# Patient Record
Sex: FEMALE | Race: WHITE
[De-identification: names, ages, dates, MRNs, and addresses within clinical notes are randomized per-mention and may not be internally consistent; named-entity substitution may affect disease eponyms.]

---
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
## 2022-01-20 NOTE — DIAGNOSTIC IMAGING REPORT
INDICATION: Fetal survey.



TECHNIQUE: Multiple real-time grayscale images were obtained over

the gravid uterus.



COMPARISON: None



FINDINGS: There is a single live fetus in a variable

presentation. Fetal heart rate was recorded at 134 bpm. Placenta

is lateral and to the right. Amniotic fluid volume appears

normal. Fetal kidneys, bladder and stomach are unremarkable.

Fetal brain is unremarkable. There is a four-chamber heart. There

is a three-vessel cord with normal insertion. Fetal spine is

unremarkable. Outflow tracts were somewhat limited due to fetal

position.



Biometrical measurements are as follows:

Biparietal 4.75 cm, age 20 weeks 3 days.

Head circumference 18.02 cm, age 20 weeks 4 days.

Abdominal circumference 15.13 cm, age 20 weeks 3 days.

Femur length 3.40 cm, age 20 weeks 5 days.



Sonographic estimate age: 20 weeks 4 days.

Sonographic estimated date of delivery: 06/05/2022.



Estimated Fetal Weight: 358 gm (+/- 52  gm).

LMP percentile: 33%.



Fetal heart rate: 134 beats per minute.



Fetal number: 1 of 1.



IMPRESSION: Single live IUP 20 weeks 4 days gestational age.

Estimated date of confinement sonographically 06/05/2022.



Dictated by: 



  Dictated on workstation # [id number]

## 2022-05-04 NOTE — DISCHARGE INST-WOMEN'S SERVICE
Discharge Inst-Women's Serv


Depart Medication/Instructions


New, Converted or Re-Newed RX:  Transmitted to Pharmacy


Final Diagnosis


POD 2 RLTCS


Problems Reviewed?:  Yes





Consults/Follow Up


Additional Follow Up:  Yes


Orders/Referrals


Dr. Barreto in 7-10 days and in 6 weeks





Activity


Activity:  Activity as Tolerated


Driving Instructions:  No Driving for 1 Week


NO SMOKING:  NO SMOKING


Nothing Inside Vagina:  No Douching, No Strayhorn, No Tampons





Diet


Discharge Diet:  No Restrictions


Symptoms to Report to :  Bleeding Excessive, Pain Increased, Fever Over 101 

Degrees F, Vaginal Bleeding Increase, Questions/Concerns


For Any Problems or Questions:  Contact Your Physician





Skin/Wound Care


Infection Signs and Symptoms:  Increased Redness, Foul Odor of Wound, Increased 

Drainage, Skin Itchy or Has a Rash, Increased Swelling, Temperature Above 101  F


Operative Area Clean and Dry:  Keep Incision Clean/Dry


Stitches/Staples/Dermabond:  Dermabond, Care of Stitches


Bathing Instructions:  AUGUSTO Nguyen DO             May 4, 2022 16:01

## 2022-05-04 NOTE — DIAGNOSTIC IMAGING REPORT
INDICATION: Evaluate growth and amniotic fluid volume.



TECHNIQUE: Multiple real-time grayscale images were obtained over

the gravid uterus.



COMPARISON: 01/20/2022.



FINDINGS: There is a single live fetus in a cephalic

presentation. Fetal heart rate was recorded at 156 bpm. Placenta

is lateral and to the right. Amniotic fluid index is 11.3 cm.



Biometrical measurements are as follows:

Biparietal 8.59 cm, age 34 weeks 5 days.

Head circumference 31.75 cm, age 35 weeks 5 days.

Abdominal circumference 29.76 cm, age 33 weeks 6 days.

Femur length 6.04 cm, age 31 weeks 3 days.



Sonographic estimate age: 34 weeks 0 days.

Sonographic estimated date of delivery: 06/15/2022.



Estimated Fetal Weight: 2176 gm (+/- 318  gm).

LMP percentile: 6%.



Fetal heart rate: 156 beats per minute.



Fetal number: 1 of 1.



IMPRESSION: Single live IUP 34 weeks gestational age showing

normal interval growth when compared with prior exam. No

complicating features are detected.



Dictated by: 



  Dictated on workstation # [id number]

## 2022-05-04 NOTE — HISTORY & PHYSICAL-OB
OB - Chief Complaint & HPI


Date/Time


Date of Admission:


Date of Admission:


Date seen by a Provider:  May 4, 2022


Time Seen by a Provider:  11:45





Chief Complaint/History


OB-Reason for Admission/Chief:  Obstetrical Complication


Hx :  2


Hx Para:  1


Expected Date of Delivery:  2022


Gestational Age in Weeks:  35


Gestational Age in Days:  4


Indication for :  desires repeat 


Other reason for admission:


Patient admitted after presenting for abdominal pains and elevated BP.  Her BP 

at arrival was in the 150s-160s/100s.  There were also variable decelerations 

noted on NST.  US ordered after arrival showed absent end diastolic flow, and 

suspected IUGR.


Admission Nurse Assessment Rev:  Yes





Allergies and Home Medications


Allergies


Coded Allergies:  


     No Known Drug Allergies (Unverified , 18)





Patient Home Medication List


Home Medication List Reviewed:  Yes


Prenatal Vit/Iron Fumarate/FA (Prenatal Vitamins Tablet) 1 Each Tablet, 1 EACH 

PO DAILY, (Reported)


   Entered as Reported by: ANGIE PETERS on 18


   Last Action: Reviewed


Discontinued Medications


Amoxicillin/Potassium Clav (Augmentin 875-125 Tablet) 1 Each Tablet, 1 EACH PO 

BID


   Discontinued Reason: No Longer Taking


   Prescribed by: JOHN BLUE on 19 1349


   Last Action: Discontinued


Cephalexin (Keflex) 500 Mg Capsule, 500 MG PO BID


   Discontinued Reason: No Longer Taking


   Prescribed by: PHIL YEUNG on 18 1429


   Last Action: Discontinued


Docusate Sodium (Docusate Sodium) 100 Mg Capsule, 100 MG PO BID PRN for 

CONSTIPATION-1ST LINE


   Discontinued Reason: No Longer Taking


   Prescribed by: AUGUSTO HASSAN on 18


   Last Action: Discontinued


Ferrous Sulfate (Iron) 325 Mg Tablet, 325 MG PO DAILY, (Reported)


   Discontinued Reason: No Longer Taking


   Entered as Reported by: ANGIE PETERS on 18


   Last Action: Discontinued


Hydrocodone Bit/Acetaminophen (Lortab  5 Mg Tablet) 1 Tab Tab, 1-2 TAB PO Q4H 

PRN for PAIN-MODERATE


   Discontinued Reason: No Longer Taking


   Prescribed by: AUGUSTO HASSAN on 18


   Last Action: Discontinued


Ibuprofen (Ibuprofen) 600 Mg Tablet, 600 MG PO Q6H


   Discontinued Reason: No Longer Taking


   Prescribed by: AUGUSTO HASSAN on 18 [id number]


   Last Action: Discontinued





OB - History


Hx of Present Pregnancy


Prenatal Care:  Yes


Ultrasounds:  Normal mid trimester US (late prenatal care)


Obstetrical Complications:  Pre-eclampsia


Medical Complications:  None





Obstetrical History


Hx :  2


Hx Para:  1


Hx Total # of Abortions (Spona:  0





Delivery History


Adverse Rxn to Tranfusion:  No





Patient Past Medical History





n/a





Social History/Family History


Alcohol Use:  Denies Use


Recreational Drug Use:  No


2nd Hand Smoke Exposure:  No





Immunizations


Hepatitis A:  Yes


Hepatitis B:  Yes





OB - Admission Exam


Physical Exam


Vitals:





Vital Signs








 22





 08:48


 


Temp 37.4


 


Pulse 60


 


Resp 18


 


Pulse Ox 99


 


O2 Delivery Room Air








HEENT:  NCAT


Heart:  Rhythm Normal


Lungs:  Clear


Extremities:  Normal


Reflexes:  Normal


Membranes:  Intact


Fetal Heart Rate:  130's


Accelerations:  No Accelerations


Decelerations:  Variable Decelerations


Short Term Variability:  Present


Long Term Variability:  Average (6-25)


Contractions on Admission:  6-10 Minutes Apart


Intensity:  Mild


Labs





Laboratory Tests








Test


 22


08:30 22


10:10 Range/Units


 


 


Urine Color YELLOW    


 


Urine Clarity CLEAR    


 


Urine pH 7.0   5-9  


 


Urine Specific Gravity 1.020   1.016-1.022  


 


Urine Protein TRACE H  NEGATIVE  


 


Urine Glucose (UA) NEGATIVE   NEGATIVE  


 


Urine Ketones NEGATIVE   NEGATIVE  


 


Urine Nitrite NEGATIVE   NEGATIVE  


 


Urine Bilirubin NEGATIVE   NEGATIVE  


 


Urine Urobilinogen 0.2   < = 1.0  MG/DL


 


Urine Leukocyte Esterase 2+ H  NEGATIVE  


 


Urine RBC (Auto) NEGATIVE   NEGATIVE  


 


Urine RBC RARE    /HPF


 


Urine WBC 2-5    /HPF


 


Urine Squamous Epithelial


Cells 0-2 


 


  /HPF





 


Urine Crystals NONE    /LPF


 


Urine Bacteria LARGE H   /HPF


 


Urine Casts NONE    /LPF


 


Urine Mucus NEGATIVE    /LPF


 


Urine Culture Indicated YES    


 


White Blood Count


 


 17.0 H


 4.3-11.0


10^3/uL


 


Red Blood Count


 


 4.72 


 3.80-5.11


10^6/uL


 


Hemoglobin  14.2  11.5-16.0  g/dL


 


Hematocrit  42  35-52  %


 


Mean Corpuscular Volume  89  80-99  fL


 


Mean Corpuscular Hemoglobin  30  25-34  pg


 


Mean Corpuscular Hemoglobin


Concent 


 34 


 32-36  g/dL





 


Red Cell Distribution Width  13.3  10.0-14.5  %


 


Platelet Count


 


 175 


 130-400


10^3/uL


 


Mean Platelet Volume  10.8  9.0-12.2  fL


 


Immature Granulocyte % (Auto)  0   %


 


Neutrophils (%) (Auto)  80 H 42-75  %


 


Lymphocytes (%) (Auto)  15  12-44  %


 


Monocytes (%) (Auto)  4  0-12  %


 


Eosinophils (%) (Auto)  1  0-10  %


 


Basophils (%) (Auto)  0  0-10  %


 


Neutrophils # (Auto)


 


 13.7 H


 1.8-7.8


10^3/uL


 


Lymphocytes # (Auto)


 


 2.5 


 1.0-4.0


10^3/uL


 


Monocytes # (Auto)


 


 0.7 


 0.0-1.0


10^3/uL


 


Eosinophils # (Auto)


 


 0.1 


 0.0-0.3


10^3/uL


 


Basophils # (Auto)


 


 0.0 


 0.0-0.1


10^3/uL


 


Immature Granulocyte # (Auto)


 


 0.1 


 0.0-0.1


10^3/uL


 


Neutrophils % (Manual)  75   %


 


Lymphocytes % (Manual)  18   %


 


Monocytes % (Manual)  4   %


 


Eosinophils % (Manual)  2   %


 


Band Neutrophils  1   %


 


Blood Morphology Comment  NORMAL   


 


Sodium Level  138  135-145  MMOL/L


 


Potassium Level  4.3  3.6-5.0  MMOL/L


 


Chloride Level  105    MMOL/L


 


Carbon Dioxide Level  22  21-32  MMOL/L


 


Anion Gap  11  5-14  MMOL/L


 


Blood Urea Nitrogen  7  7-18  MG/DL


 


Creatinine


 


 0.68 


 0.60-1.30


MG/DL


 


Estimat Glomerular Filtration


Rate 


 127 


  





 


BUN/Creatinine Ratio  10   


 


Glucose Level  72    MG/DL


 


Uric Acid  7.3 H 2.6-7.2  MG/DL


 


Calcium Level  9.4  8.5-10.1  MG/DL


 


Corrected Calcium  9.8  8.5-10.1  MG/DL


 


Total Bilirubin  0.5  0.1-1.0  MG/DL


 


Aspartate Amino Transf


(AST/SGOT) 


 65 H


 5-34  U/L





 


Alanine Aminotransferase


(ALT/SGPT) 


 58 H


 0-55  U/L





 


Alkaline Phosphatase  120    U/L


 


Lactate Dehydrogenase  257 H 125-220  U/L


 


Total Protein  6.8  6.4-8.2  GM/DL


 


Albumin  3.5  3.2-4.5  GM/DL


 


Thyroid Stimulating Hormone


(TSH) 


 1.26 


 0.35-4.94


UIU/ML











OB - Assessment/Plan/Diagnosis


Assessment


Assessment:   section


Admission Dx


23 yo  @ 35 weeks


Fetal heart rate decelerations


Fetal growth restriction suspected


Suspected Severe Preeclampsia due to elevated liver enzymes, uric acid, and 

pending Urine protein/cr ratio.


Previous 


Late prenatal care.


GBS unknown


Admission Status:  Inpatient Order (span 2 midnights)


Reason for Inpatient Admission:  


Repeat 





Plan


Plan:   Section











AUGUSTO HASSAN DO             May 4, 2022 11:12

## 2022-05-04 NOTE — OPERATIVE REPORT
DATE OF SERVICE:  



PREOPERATIVE DIAGNOSES:

1.  A 22-year-old G2, P1 at 35 weeks and 4 days' gestation.

2.  Fetal heart rate variable decelerations.

3.  Gestational hypertension with this option of preeclampsia.

4.  Absent end-diastolic flow on uterine artery Dopplers.

5.  Suspected intrauterine growth restriction.

6.  Previous  section.



POSTOPERATIVE DIAGNOSES:

1.  A 22-year-old G2, P1 at 35 weeks and 4 days' gestation.

2.  Fetal heart rate variable decelerations.

3.  Gestational hypertension with this option of preeclampsia.

4.  Absent end-diastolic flow on uterine artery Dopplers.

5.  Suspected intrauterine growth restriction.

6.  Previous  section.



PROCEDURE:

Repeat low transverse  section.



SURGEON:

Tino Hassan DO



ANESTHESIA:

Spinal.



ESTIMATED BLOOD LOSS:

250 mL.



URINE OUTPUT:

100 mL clear at the end of procedure.



FLUIDS:

900 mL lactated Ringer's solution.



FINDINGS:

A live male infant weighing 3 pounds 12 ounces, Apgars of 8 and 9.  Grossly

normal appearing uterus, fallopian tubes and ovaries.



SPECIMEN SENT:

Placenta.



INDICATIONS FOR PROCEDURE:

This 22-year-old female is a patient who had come to the hospital for severe

abdominal pain and "not feeling right."  She reports that this started this

morning.  Upon arrival at the hospital, she had blood pressures of 160s/100s. 

Stat preeclampsia labs were ordered and she was found to have a white count of

17,000 and AST and ALT that were both elevated at 65 and 58 respectively and

uric acid 7.3.  She also was noted to have protein in her urine due to concerns

for severe preeclampsia as well as nonreassuring fetal status.  The decision was

made to proceed with delivery today.  Risks of the procedure were discussed with

the patient in detail as it had already been discussed in her prenatal care, all

of her questions were answered, consent was obtained, the patient was taken to

the operating room.



OPERATIVE REPORT IN DETAIL:

Once in the operating room, anesthesia was found to be adequate.  She was placed

in the supine position with leftward tilt, prepped and draped in normal sterile

fashion.  Timeout was performed and anesthesia was tested.  I did make a

Pfannenstiel skin incision through the previously existing scar using a knife

and carried down to underlying fascia using Bovie cautery.  The fascial incision

was extended laterally using Bovie cautery.  Superior aspect of fascial incision

was then grasped with Kocher clamps, tented up and dissected off the underlying

rectus muscles.  The inferior aspect of the fascial incision was then grasped

with Kocher clamps, tented up and dissected off the underlying rectus muscles. 

Rectus muscles were dissected down the midline, which exposed the peritoneum,

which I entered bluntly and extended using blunt traction.  An Dago ring

retractor was placed in the peritoneal incision, which offers excellent lateral

sidewall retraction.  I identified the lower uterine segment, which was found to

be thinned out and made a low transverse incision to the vesicouterine

peritoneum and bluntly dissected off the lower uterine segment, creating a

bladder flap.  I then proceeded with my myotomy until membranes were visualized,

at which point I extended the uterine incision laterally and superiorly using

bandage scissors.  Amniotomy was then performed using Allis clamp.  Clear fluid

was noted.  The infant was found in vertex presentation.  With gentle fundal

pressure, the infant's head was elevated and delivered through the incision

where the nares and oropharynx were bulb suctioned.  Anterior and posterior

shoulders were delivered.  The infant was then brought to the operative field

with cord doubly clamped and cut and infant was handed off to waiting nurses and

Dr. Rodriguez, who was attending the delivery.  Cord blood was collected.  Two-vessel

cord with intact placenta was delivered spontaneously thereafter.  IV Pitocin

was initiated to facilitate uterine contraction.  Uterine fundus confirmed by

manual massage.  The uterus was then exteriorized and cleared of all endometrial

clots and debris.  I then proceeded with closing the uterine incision using 0

Vicryl suture in a running locked fashion.  Second layer of imbricating 0

Monocryl was placed.  Excellent hemostasis was noted after doing this.  I then

placed the uterus back in the pelvis and copiously irrigated the pelvis using

normal saline.  Once again, there was no active bleeding noted from any of my

dissection planes.  I placed Interceed antiadhesive over my low transverse

incision.  I removed the Dago ring retractor and then proceeded with closing

the peritoneum using 3-0 Vicryl suture in a running fashion.  The rectus muscles

were reapproximated using 3-0 Vicryl suture in interrupted fashion.  The fascia

was reapproximated using 0 Vicryl suture in a running fashion.  The subcutaneous

tissue was reapproximated using 3-0 plain interrupted subcutaneous stitch and

the skin was reapproximated using 4-0 Monocryl in a running subcuticular. 

Dermabond was applied to incision and sterile dressing with adhesive white tape.

 The patient tolerated the procedure well and sent to recovery area in stable

condition.  Lap and sponge counts were correct at the end of the procedure. 

Instrument counts correct as well.  Two grams of Ancef were given preoperatively

for infection prophylaxis.





Job ID: [id number]

DocumentID: [id number]

Dictated Date:  2022 16:15:17

Transcription Date: 2022 22:04:54

Dictated By: TINO HASSAN DO

## 2022-05-05 NOTE — ANESTHESIA-REGIONAL POST-OP
Regional


Patient Condition


Mental Status:  Alert, Oriented x3


Circulation:  Same as Pre-Op


Headache:  Absent


Sensation:  Full Recovery


Motor Block:  Absent





Post Op Complications


Complications


None





Follow Up Care/Instructions


Patient Instructions


None needed.





Anesthesia/Patient Condition


Patient is doing well, no complaints, stable vital signs, no apparent adverse 

anesthesia problems.











YELITZA ALLISON DO          May 5, 2022 13:41

## 2022-05-05 NOTE — POSTPARTUM PROGRESS NOTE
Postpartum Note


Postpartum Note


Postpartum Day # 1





Subjective:


Patient is without complaints. Ambulating, voiding. Tolerating a regular diet 

without nausea or vomiting. Normal lochia. Pain is well controlled with oral 

pain medications.





Objective:








Physical Exam:


General - Alert and oriented, no apparent distress


Abdomen - Soft, appropriately tender to palpation, non-distended, fundus firm at

umbilicus


Extremities - no edema, negative Felicia's bilaterally 


Incision- c/d/i








Assessment:


POD 1 RLTCS


Elevated Liver Enzymes


Leukocytosis








Plan:


Routine postpartum care.


Repeat labs tomorrow


Encourage breast feeding.


Encourage ambulation.


Ferrous sulfate supplementation.


Plan for discharge tomorrow





Vitals - Labs


Vital Signs - I&O





Vital Signs








  Date Time  Temp Pulse Resp B/P (MAP) Pulse Ox O2 Delivery O2 Flow Rate FiO2


 


5/5/22 07:50 36.7 69 18 127/68 (87) 97 Room Air  


 


5/5/22 02:54 36.6 73 16 138/68 (91) 97 Room Air  


 


5/5/22 00:15 36.8 82 16 126/73 (90) 96 Room Air  


 


5/4/22 20:40 36.9 95 16 122/62 (82) 96 Room Air  


 


5/4/22 16:22 36.6 64 16 136/75 (95) 99 Room Air  


 


5/4/22 14:04      Room Air  


 


5/4/22 14:02 36.7  23 123/71 (88) 99 Room Air  


 


5/4/22 13:50      Room Air  


 


5/4/22 13:50 36.6  21 122/74 (90) 98 Room Air  


 


5/4/22 13:36 36.8  21 110/54 (72) 98 Room Air  


 


5/4/22 13:36      Room Air  


 


5/4/22 13:20 36.5  19 126/79 (95) 98 Room Air  


 


5/4/22 13:20      Room Air  


 


5/4/22 13:05      Room Air  


 


5/4/22 13:05 36.6  20 124/81 (95) 98 Room Air  


 


5/4/22 12:03  69 18 121/69 (86)  Room Air  


 


5/4/22 11:53  102 18 137/83 (101)  Room Air  


 


5/4/22 11:44  83 18 148/82 (104)  Room Air  


 


5/4/22 11:32  80 18 137/90 (106)  Room Air  


 


5/4/22 10:03  60 18 124/80 (95)  Room Air  


 


5/4/22 09:53  78 18 143/76 (98)  Room Air  


 


5/4/22 09:43  68 18 146/75 (98) 98 Room Air  


 


5/4/22 09:34  56 18 134/78 (96) 96 Room Air  


 


5/4/22 09:24  61 18 150/91 (110) 96 Room Air  


 


5/4/22 09:13  61 18 133/71 (91) 99 Room Air  


 


5/4/22 09:03  66 18 152/81 (104) 98 Room Air  


 


5/4/22 08:52  65 18 157/90 (112) 99 Room Air  


 


5/4/22 08:48 37.4 60 18  99 Room Air  














I & O 


 


 5/5/22





 07:00


 


Intake Total 2550 ml


 


Output Total 1200 ml


 


Balance 1350 ml











Labs


Laboratory Tests


5/4/22 10:10: 


White Blood Count 17.0H, Red Blood Count 4.72, Hemoglobin 14.2, Hematocrit 42, 

Mean Corpuscular Volume 89, Mean Corpuscular Hemoglobin 30, Mean Corpuscular 

Hemoglobin Concent 34, Red Cell Distribution Width 13.3, Platelet Count 175, 

Mean Platelet Volume 10.8, Immature Granulocyte % (Auto) 0, Neutrophils (%) 

(Auto) 80H, Lymphocytes (%) (Auto) 15, Monocytes (%) (Auto) 4, Eosinophils (%) 

(Auto) 1, Basophils (%) (Auto) 0, Neutrophils # (Auto) 13.7H, Lymphocytes # 

(Auto) 2.5, Monocytes # (Auto) 0.7, Eosinophils # (Auto) 0.1, Basophils # (Auto)

0.0, Immature Granulocyte # (Auto) 0.1, Neutrophils % (Manual) 75, Lymphocytes %

(Manual) 18, Monocytes % (Manual) 4, Eosinophils % (Manual) 2, Band Neutrophils 

1, Blood Morphology Comment NORMAL, Sodium Level 138, Potassium Level 4.3, 

Chloride Level 105, Carbon Dioxide Level 22, Anion Gap 11, Blood Urea Nitrogen 

7, Creatinine 0.68, Estimat Glomerular Filtration Rate 127, BUN/Creatinine Ratio

10, Glucose Level 72, Uric Acid 7.3H, Calcium Level 9.4, Corrected Calcium 9.8, 

Total Bilirubin 0.5, Aspartate Amino Transf (AST/SGOT) 65H, Alanine 

Aminotransferase (ALT/SGPT) 58H, Alkaline Phosphatase 120, Lactate Dehydrogenase

257H, Total Protein 6.8, Albumin 3.5, Thyroid Stimulating Hormone (TSH) 1.26


5/5/22 05:35: 


White Blood Count 18.2H, Red Blood Count 3.89, Hemoglobin 11.5, Hematocrit 35, 

Mean Corpuscular Volume 90, Mean Corpuscular Hemoglobin 30, Mean Corpuscular 

Hemoglobin Concent 33, Red Cell Distribution Width 13.6, Platelet Count 144, 

Mean Platelet Volume 11.1, Immature Granulocyte % (Auto) 1, Neutrophils (%) (Aut

o) 82H, Lymphocytes (%) (Auto) 13, Monocytes (%) (Auto) 4, Eosinophils (%) 

(Auto) 0, Basophils (%) (Auto) 0, Neutrophils # (Auto) 14.8H, Lymphocytes # 

(Auto) 2.4, Monocytes # (Auto) 0.8, Eosinophils # (Auto) 0.0, Basophils # (Auto)

0.0, Immature Granulocyte # (Auto) 0.2H











AUGUSTO HASSAN DO            May 5, 2022 8:47 am